# Patient Record
(demographics unavailable — no encounter records)

---

## 2017-05-18 NOTE — REP
Clinical:  Anatomical evaluation.

 

Comparison: None .

 

Findings:

Examination demonstrates a single live intrauterine pregnancy in cephalic

presentation.  Fetal motion is identified by technologist.  Placenta is noted

posteriorly and grade zero without evidence for placenta previa or abruption.

Amniotic fluid volume is normal.  Cervix measures 3.9 cm in length and appears

closed.  No evidence for nuchal cord.

 

Gestational age by LMP 19 weeks 4 days with CRISTINO 10/08/2017 .

Gestational age by current measurements 20 weeks 1 day with CRISTINO 10/04/2017 .

 

FHR equals 147 beats per minute.

BPD  4.6 cm     20 weeks 0 days

HC  17.3 cm     19 weeks 6 days

AC  15.1 cm     20 weeks 2 days

FL  3.2 cm      20 weeks 1 day

HL  3.1 cm      20 weeks 1 day

HC/AC ratio  1.15

 

Estimated fetal weight 338 grams ( 68th percentile).

 

Anatomical assessment demonstrates normal structures including cranium, choroid

plexus, cavum, cerebellum/posterior fossa, facial features, lungs, diaphragm,

stomach, cord insertion/three-vessel cord, kidneys/bladder, spine, and

extremities.

 

Impression:

1.  Single live intrauterine pregnancy in cephalic presentation demonstrating

appropriate interval growth.

2.  Limited evaluation of the heart and ventricular outflow tracts noted.

Remainder of the anatomical assessment is complete and normal.

 

 

Signed by

Dony Mendieta MD 05/18/2017 04:41 P

## 2017-06-15 NOTE — REP
Clinical:  Anatomical evaluation.

 

Comparison: 05/18/2017 .

 

Findings:

Examination demonstrates a single live intrauterine pregnancy in variable

presentation.  Fetal motion is identified by technologist.  Placenta is noted

posteriorly and grade zero without evidence for placenta previa or abruption.

Amniotic fluid volume is normal.  Cervix measures 5.3 cm in length and appears

closed.  No evidence for nuchal cord.

 

Gestational age by LMP 23 weeks 3 days with CRISTINO 10/08/2017 .

Gestational age by current measurements 23 weeks 4 days with CRISTINO 10/07/2017 .

 

FHR equals 157 beats per minute.

Estimated fetal weight 600 grams ( 47th percentile).

 

Anatomical assessment demonstrates normal structures including cranium, choroid

plexus, cavum, cerebellum/posterior fossa, facial features, lungs, four-chamber

heart/ventricular outflow tracts, diaphragm, stomach, cord insertion/three-vessel

cord, kidneys/bladder, spine, and extremities.

 

Impression:

1.  Single live intrauterine pregnancy in variable presentation demonstrating

appropriate interval growth.

2.  In conjunction with prior examination anatomical assessment is complete and

normal.

 

 

Signed by

Dony Mendieta MD 06/15/2017 07:02 A

## 2017-10-19 NOTE — ECHO
DATE OF PROCEDURE:  10/19/2017

 

REFERRING PHYSICIAN:  Dr. Christine Benitez

INDICATION:   Dyspnea, unspecified (orthopnea), possible postpartum

cardiomyopathy.

 

HEIGHT:  66 inches

WEIGHT:  87.9 kg

 

MEASUREMENTS:

Left atrium:  3.5 cm

Ventricular septum:  0.78 cm

Posterior wall:  0.87 cm

Left ventricle diastole:  4.6 cm

Aortic root:  2.8 cm

LVOT:  2.1 cm

Inferior vena cava:  2.2 cm

 

DOPPLER MEASUREMENTS:

Aortic valve velocity:  183 cm/s

LVOT velocity:  131 cm/s

LVOT VTI:  24.4 cm

Very mild mitral regurgitation

Mitral E velocity:  111 cm/s

Mitral A velocity:  65.6 cm/s

Mitral deceleration time:  180 ms

Mild tricuspid regurgitation.

Estimated right ventricle systolic pressure:  30 mmHg assuming a pressure of 5

mmHg

 

MITRAL ANNULAR TISSUE DOPPLER:

E prime septal:  11.3 cm/s

E prime lateral:  28.7 cm/s

 

DESCRIPTION:  Rhythm was sinus. Image quality was good. This was a 2D, M-mode,

color flow Doppler and pulse wave Doppler examination and included mitral annular

tissue Doppler.

 

CONCLUSIONS:

1.  Normal left ventricle internal dimensions and wall thickness. Normal regional

left ventricular (LV) wall motion and wall thickening. Normal LV systolic

function. Left ventricular ejection fraction (LVEF) 60% by visual estimate.

Normal LV diastolic function.

 

2. Presence of a very small pericardial effusion. No diastolic chamber collapse.

 

 

3. Dilated inferior vena cava (2.2 cm). Insufficient amount of observation to

assess respiratory variation accurately.

 

4. Otherwise normal appearing echocardiogram Doppler.

## 2017-10-19 NOTE — REP
Chest two views

 

HISTORY: Dyspnea

 

Comparison: 03/13/2006

 

The lungs are clear.  The heart is normal in size.  The pulmonary vasculature is

normal in appearance.  The bony structure is intact.

 

IMPRESSION:  No acute disease.

 

 

Signed by

Sergio Tobar MD 10/19/2017 12:06 P

## 2017-10-19 NOTE — REP
CT ANGIOGRAM CHEST:

 

TECHNIQUE:  Axial contrast enhanced images from the thoracic inlet to the upper

abdomen using 100 mL Isovue 370 intravenous contrast material with multiplanar

reformations.

 

There is no evidence of acute infiltrate in either lung.  The heart is normal in

size. There is no pericardial effusion.  There are tiny pleural effusions.  There

is no mediastinal, hilar, or chest wall lymphadenopathy.  There is no evidence of

pulmonary embolism.  There is no thoracic aortic aneurysm or dissection.  The

visualized upper abdominal structures appear unremarkable.

 

IMPRESSION:

 

Tiny pleural effusions bilaterally.  No infiltrates are seen.  There is no CT

evidence of pulmonary embolism.

 

 

Signed by

Rustam Ward MD 10/19/2017 05:36 P

## 2017-10-20 NOTE — ECGEPIP
Stationary ECG Study

                           [facility] - ED

                                       

                                       Test Date:    2017-10-19

Pat Name:     TAMY DE LA ROSA        Department:   

Patient ID:   [id number]                 Room:         -

Gender:       F                        Technician:   valery

:          1990               Requested By: Marlene Noguera 

Order Number: [id number]     Reading MD:   Alexandre Dale

                                 Measurements

Intervals                              Axis          

Rate:         58                       P:            42

AR:           154                      QRS:          24

QRSD:         92                       T:            6

QT:           380                                    

QTc:          373                                    

                           Interpretive Statements

SINUS BRADYCARDIA

NSTTW ABNORMALITIES

NO PRIORS

 

Electronically Signed On 10- 6:01:35 EDT by Alexandre Dale

## 2017-12-19 NOTE — REPUSA
CT of the abdomen and pelvis without contrast

Clinical statement: Pain.

Technique: Multiple axial CT images were obtained from the base of the lungs to the floor of the pelv
is utilizing 5 mm axial slices without administration of contrast. Coronal and sagittal reconstructio
ns were also obtained.

Comparison: 12/4/2013.

Findings:

Chest: The visualized lung bases are clear.

Abdomen: The kidneys are normal in size bilaterally. There is a tiny 1 mm nonobstructing stone in the
 right kidney. There is moderate left-sided hydronephrosis and hydroureter, caused by a 4 mm obstruct
ing stone at the left ureterovesical junction. The liver, spleen, pancreas, gallbladder and adrenal g
lands are unremarkable. The aorta demonstrates normal caliber and contour. There is no abdominal lymp
hadenopathy or ascites.

Pelvis: The bowel is unremarkable, with no obstructive or inflammatory changes. The appendix is bernard
l. The urinary bladder is within normal limits. There is no pelvic lymphadenopathy or ascites. The ot
her pelvic structures appear unremarkable.

Bones: There are no suspicious osseous abnormalities seen.

Impression: Moderate left-sided hydronephrosis and hydroureter caused by 4 mm obstructing stone at th
e left ureterovesical junction.

     Electronically signed by LAURA REARDON MD on 12/19/2017 11:39:25 PM ET

## 2019-06-11 NOTE — RO
DATE OF PROCEDURE:  2019

 

PREOPERATIVE DIAGNOSIS:  Missed /spontaneous and first trimester

miscarriage at 6 weeks gestation.

 

POSTOPERATIVE DIAGNOSIS:  Missed /spontaneous and first trimester

miscarriage at 6 weeks gestation.

 

PROCEDURE PERFORMED:  Suction dilation and curettage.

 

SURGEON:  Dr. Taiwo oPwer DO FACOG

 

ASSISTANT:  None.

 

ANESTHESIA:  General via laryngeal mask anesthesia (LMA.

 

SPECIMENS TO PATHOLOGY:  Products of conception.

 

ESTIMATED BLOOD LOSS: 200 mL.

 

FLUIDS REPLACED:  4 mL lactated Ringer's.

 

DRAINS:  In-and-out catheter 100 mL urine output.

 

COMPLICATIONS:  Doxycycline 100 mg IV times one for preoperative antibiotics.

 

INTRAOPERATIVE FINDINGS:  Uterus sounded to 8 cm and tissue obtained was grossly

consistent with products of conception.

 

DESCRIPTION OF PROCEDURE:  Patient is a 28-year-old recently diagnosed with a 
first

trimester miscarriage. The measurements were consistent with 6+ weeks

gestation.  The patient was counseled on expectant versus medical versus 
surgical

management and she elected to proceed with surgical management via suction

dilation and curettage.

 

The patient was counseled on the risks, benefits, indications, alternatives of

procedure.  Informed consent was obtained.  She was taken to the operating room

with an IV running,  placed on operating table in the dorsal supine position.

General anesthesia was administered and airway secured without any difficulty.

She was placed in the high lithotomy position.  She was prepared and draped

in normal sterile fashion.

 

A time-out was performed per protocol.  The bladder was drained with sterile

in-and-out catheter.  A sterile speculum was placed with good visualization of

the cervix.  The anterior lip of the cervix was grasped with a single-tooth

tenaculum and downward traction was applied.  The Chris dilators were used to

dilate the cervix up to a #20 and a size 9 curved Vacurette was placed

transcervical into the intrauterine cavity and suction was applied.  Multiple

passes of the Vacurette were performed until there was minimal blood and tissue

return.  Upon minimal blood and tissue return the Vacurette  was removed.  A

sharp curette was placed transcervical into the intrauterine cavity.  A sharp

curettage was performed throughout the cavity until a gritty texture was noted

all throughout the cavity.  The sharp curette was removed.  The Vacurette was

placed transcervical into the intrauterine cavity for one additional pass.

Minimal blood and tissue return was noted.  Decision was made to conclude the

procedure.

 

The single-tooth tenaculum was removed.  The tenaculum sites were cauterized 
with

silver nitrate.  Minimal bleeding from the cervical os was noted.  The patient

tolerated the entire procedure very well.  She was transferred to the

postanesthesia care unit (PACU) in good and stable condition.

JEFFREY